# Patient Record
Sex: MALE | Race: BLACK OR AFRICAN AMERICAN | NOT HISPANIC OR LATINO | URBAN - METROPOLITAN AREA
[De-identification: names, ages, dates, MRNs, and addresses within clinical notes are randomized per-mention and may not be internally consistent; named-entity substitution may affect disease eponyms.]

---

## 2024-10-07 ENCOUNTER — OFFICE VISIT (OUTPATIENT)
Dept: URGENT CARE | Age: 24
End: 2024-10-07
Payer: MEDICAID

## 2024-10-07 VITALS
DIASTOLIC BLOOD PRESSURE: 83 MMHG | WEIGHT: 175.04 LBS | RESPIRATION RATE: 18 BRPM | TEMPERATURE: 98.7 F | SYSTOLIC BLOOD PRESSURE: 118 MMHG | OXYGEN SATURATION: 97 % | HEART RATE: 73 BPM

## 2024-10-07 DIAGNOSIS — Z11.3 SCREEN FOR STD (SEXUALLY TRANSMITTED DISEASE): ICD-10-CM

## 2024-10-07 DIAGNOSIS — Z20.2 POSSIBLE EXPOSURE TO STI: Primary | ICD-10-CM

## 2024-10-07 PROCEDURE — 87591 N.GONORRHOEAE DNA AMP PROB: CPT

## 2024-10-07 PROCEDURE — 87491 CHLMYD TRACH DNA AMP PROBE: CPT

## 2024-10-07 PROCEDURE — 87661 TRICHOMONAS VAGINALIS AMPLIF: CPT

## 2024-10-07 PROCEDURE — 99203 OFFICE O/P NEW LOW 30 MIN: CPT | Performed by: NURSE PRACTITIONER

## 2024-10-07 PROCEDURE — 87529 HSV DNA AMP PROBE: CPT

## 2024-10-07 NOTE — PATIENT INSTRUCTIONS
We will send your urine for STD testing and call you with results if positive.  It takes about 72 hrs for results to come back

## 2024-10-07 NOTE — PROGRESS NOTES
Subjective   Patient ID: Edmundo Artis is a 24 y.o. male. They present today with a chief complaint of STI Screening.    History of Present Illness  HPI  Pt is a 24 yr old male who has clear discharge from his meatus and a dry spot on the shaft of his penis.  He also has a lesion on his upper lip that all started a week ago  Past Medical History  Allergies as of 10/07/2024    (No Known Allergies)       (Not in a hospital admission)       No past medical history on file.    No past surgical history on file.         Review of Systems  Review of Systems  Gen: No fatigue, fever, sweats.  Head: No headache, trauma.  Eyes: No vision loss, double vision, drainage, eye pain.  ENT: No hearing changes, pain, epistaxis, congestion  Cardiac: No chest pain  Pulmonary: No shortness of breath,  pleuritic pain,   Heme/lymph: No swollen glands  GI: No abdominal pain, nausea, vomiting, diarrhea  : No  dysuria, frequency, urgency, hematuria +penile discharge  Musculoskeletal: No limb pain, joint pain, back pain, joint swelling or stiffness.  Skin: No rashes, pruritus, lumps, lesions.  Neuro: No Numbness, tingling, or weakness.  Psych: No  anxiety     Review of systems is otherwise negative unless stated above or in history of present illness.                             Objective    Vitals:    10/07/24 1208   BP: 118/83   Pulse: 73   Resp: 18   Temp: 37.1 °C (98.7 °F)   SpO2: 97%   Weight: 79.4 kg (175 lb 0.7 oz)     No LMP for male patient.    Physical Exam  General: Vital signs stable, Pt is alert, no acute distress  Eyes: Conjunctiva normal, PERRL, EOMs intact  HENMT: Normocephalic, atraumatic, external ears and nose normal, no scars or masses.  No mastoid tenderness. Trachea is midline. No meningeal signs, negative Kernig and Brudzinski, moves neck freely.  No sinus tenderness  Resp: Respiratory effort is normal, no retractions, no stridor. Lungs CTA, no wheezes or rhonchi  CV: Heart is regular rate and rhythm.   Skin: + lesion  on upper lip looks, dry patch on shaft of penis no lesions on exam  Skel: full range of motion of upper and lower extremities.   Neuro: Normal gait, CN II-XII intact, no motor or sensory changes.  Psych: Alert and oriented ×3, judgment is appropriate, normal mood and affect   Procedures    Point of Care Test & Imaging Results from this visit  No results found for this visit on 10/07/24.   No results found.    Diagnostic study results (if any) were reviewed by KIERSTEN Christensen.    Assessment/Plan   Allergies, medications, history, and pertinent labs/EKGs/Imaging reviewed by KIERSTEN Christensen.     Medical Decision Making  History and physical consistent with possible std.  Testing done and will call him with results.  No signs of syphilis, no chancre on penis.  If still concerned after testing he can go to his pcp and have other blood work done    Orders and Diagnoses  There are no diagnoses linked to this encounter.    Medical Admin Record      Patient disposition: Home    Electronically signed by KIERSTEN Christensen  12:12 PM

## 2024-10-08 ENCOUNTER — TELEPHONE (OUTPATIENT)
Dept: URGENT CARE | Age: 24
End: 2024-10-08

## 2024-10-08 DIAGNOSIS — A74.9 CHLAMYDIA: Primary | ICD-10-CM

## 2024-10-08 LAB
C TRACH RRNA SPEC QL NAA+PROBE: POSITIVE
HSV1 DNA SKIN QL NAA+PROBE: NOT DETECTED
HSV2 DNA SKIN QL NAA+PROBE: NOT DETECTED
N GONORRHOEA DNA SPEC QL PROBE+SIG AMP: NEGATIVE
T VAGINALIS RRNA SPEC QL NAA+PROBE: NEGATIVE

## 2024-10-08 RX ORDER — DOXYCYCLINE 100 MG/1
100 CAPSULE ORAL 2 TIMES DAILY
Qty: 20 CAPSULE | Refills: 0 | Status: SHIPPED | OUTPATIENT
Start: 2024-10-08 | End: 2024-10-18

## 2024-10-08 RX ORDER — METRONIDAZOLE 500 MG/1
500 TABLET ORAL 3 TIMES DAILY
Qty: 14 TABLET | Refills: 0 | Status: SHIPPED | OUTPATIENT
Start: 2024-10-08 | End: 2024-10-08

## 2024-10-08 RX ORDER — METRONIDAZOLE 500 MG/1
500 TABLET ORAL 2 TIMES DAILY
Qty: 14 TABLET | Refills: 0 | Status: SHIPPED | OUTPATIENT
Start: 2024-10-08 | End: 2024-10-08 | Stop reason: ENTERED-IN-ERROR

## 2024-10-08 NOTE — TELEPHONE ENCOUNTER
Spoke with patient he is aware of his results and will call in flagyl for the positive chlamydia to [facility] in [place]

## 2024-11-06 ENCOUNTER — OFFICE VISIT (OUTPATIENT)
Dept: URGENT CARE | Age: 24
End: 2024-11-06
Payer: MEDICAID

## 2024-11-06 VITALS
OXYGEN SATURATION: 97 % | TEMPERATURE: 97.8 F | WEIGHT: 171 LBS | HEART RATE: 65 BPM | HEIGHT: 67 IN | SYSTOLIC BLOOD PRESSURE: 130 MMHG | DIASTOLIC BLOOD PRESSURE: 82 MMHG | BODY MASS INDEX: 26.84 KG/M2

## 2024-11-06 DIAGNOSIS — Z11.3 ROUTINE SCREENING FOR STI (SEXUALLY TRANSMITTED INFECTION): Primary | ICD-10-CM

## 2024-11-06 PROCEDURE — 3008F BODY MASS INDEX DOCD: CPT | Performed by: NURSE PRACTITIONER

## 2024-11-06 PROCEDURE — 87661 TRICHOMONAS VAGINALIS AMPLIF: CPT

## 2024-11-06 PROCEDURE — 87491 CHLMYD TRACH DNA AMP PROBE: CPT

## 2024-11-06 PROCEDURE — 99213 OFFICE O/P EST LOW 20 MIN: CPT | Performed by: NURSE PRACTITIONER

## 2024-11-06 PROCEDURE — 87591 N.GONORRHOEAE DNA AMP PROB: CPT

## 2024-11-06 NOTE — PROGRESS NOTES
"Subjective   Patient ID: Edmundo Artis is a 24 y.o. male. They present today with a chief complaint of STI Screening.    History of Present Illness  23 yo male coming in for STI testing. He states he was treated for Chlamydia and took all of the medications and just wants to make sure he is clear. He denies any discharge or dysuria. He denies any other complaints.     Past Medical History  Allergies as of 11/06/2024    (No Known Allergies)       (Not in a hospital admission)       No past medical history on file.    No past surgical history on file.         Review of Systems  Review of Systems:  General: No weight loss, fatigue, anorexia, insomnia, fever, chills.  Cardiac: No chest pain, palpitations, syncope, near syncope.  Pulmonary:  No shortness of breath, cough, hemoptysis  Heme/lymph: No swollen glands, fever, bleeding  GI: No abdominal pain, change in bowel habits, melena, hematemesis, hematochezia, nausea, vomiting, or diarrhea  : No discharge, dysuria, frequency, urgency, hematuria  Musculoskeletal: No limb pain, joint pain, joint swelling.  Skin: No rashes  Neuro: No numbness, tingling, headaches                                 Objective    Vitals:    11/06/24 1217   BP: 130/82   BP Location: Left arm   Patient Position: Sitting   BP Cuff Size: Large adult   Pulse: 65   Temp: 36.6 °C (97.8 °F)   TempSrc: Oral   SpO2: 97%   Weight: 77.6 kg (171 lb)   Height: 1.702 m (5' 7\")     No LMP for male patient.    Physical Exam  Physical Exam:  General: Vital noted, no distress. Afebrile  Cardiac: Regular rate and rhythm, no murmur  Pulmonary: Lungs clear bilaterally with good aeration. No adventitious breath sounds.  Abdomen: Soft, nontender, nonsurgical. No peritoneal signs. Normoactive bowel sounds.   Skin: No rashes  Neuro: No focal neurologic deficits, NIH score of 0.      Procedures    Point of Care Test & Imaging Results from this visit  No results found for this visit on 11/06/24.   No results " found.    Diagnostic study results (if any) were reviewed by KIERSTEN Marie.    Assessment/Plan   Allergies, medications, history, and pertinent labs/EKGs/Imaging reviewed by KIERSTEN Marie.     Medical Decision Making  Testing: GC/ chlamydia/trich   Treatment: No treatment given at this time.   Differential: 1) sti screening, 2) chlamydia, 3) uti  Plan: Discussed differential with the pateint. Patient will follow up with the PCP in the next 2-3 days. Return for any worsening symptoms or go to the ER for further evaluation. Patient understands return precautions and discharege insturctions.  Impression:   1) STI screening      Orders and Diagnoses  Diagnoses and all orders for this visit:  Routine screening for STI (sexually transmitted infection)  -     Trichomonas vaginalis, Amplified  -     C. trachomatis / N. gonorrhoeae, Amplified      Medical Admin Record      Patient disposition: Home    Electronically signed by KIERSTEN Marie  12:26 PM

## 2024-11-07 LAB
C TRACH RRNA SPEC QL NAA+PROBE: NEGATIVE
N GONORRHOEA DNA SPEC QL PROBE+SIG AMP: NEGATIVE
T VAGINALIS RRNA SPEC QL NAA+PROBE: NEGATIVE

## 2024-11-16 ENCOUNTER — OFFICE VISIT (OUTPATIENT)
Dept: URGENT CARE | Age: 24
End: 2024-11-16
Payer: MEDICAID

## 2024-11-16 VITALS
SYSTOLIC BLOOD PRESSURE: 131 MMHG | WEIGHT: 170 LBS | BODY MASS INDEX: 26.63 KG/M2 | DIASTOLIC BLOOD PRESSURE: 77 MMHG | HEART RATE: 70 BPM

## 2024-11-16 DIAGNOSIS — Z20.2 POTENTIAL EXPOSURE TO STD: Primary | ICD-10-CM

## 2024-11-16 LAB
POC BILIRUBIN, URINE: NEGATIVE
POC BLOOD, URINE: NEGATIVE
POC GLUCOSE, URINE: NEGATIVE MG/DL
POC KETONES, URINE: NEGATIVE MG/DL
POC LEUKOCYTES, URINE: NEGATIVE
POC NITRITE,URINE: NEGATIVE
POC PH, URINE: 6 PH
POC PROTEIN, URINE: NEGATIVE MG/DL
POC SPECIFIC GRAVITY, URINE: 1.01
POC UROBILINOGEN, URINE: 0.2 EU/DL

## 2024-11-16 PROCEDURE — 87661 TRICHOMONAS VAGINALIS AMPLIF: CPT

## 2024-11-16 PROCEDURE — 87491 CHLMYD TRACH DNA AMP PROBE: CPT

## 2024-11-16 PROCEDURE — 87591 N.GONORRHOEAE DNA AMP PROB: CPT

## 2024-11-16 NOTE — PATIENT INSTRUCTIONS
You were seen at Urgent Care today for STD screening.  You will be notified of any positive results and start appropriate treatment at that time. Monitor for red flags which we spoke about, If your symptoms change, worsen or become concerning in any way, please go to the emergency room immediately, otherwise you can followup with your PCP in 2-3 days as needed

## 2024-11-16 NOTE — PROGRESS NOTES
Subjective   Patient ID: Edmundo Artis is a 24 y.o. male. They present today with a chief complaint of Exposure to STD (Patient has concerns of a possible STD . ).    History of Present Illness  Patient is a 24-year-old male with no reported past medical history presents urgent care today for possible STD exposure.  He is asymptomatic at this time but is requesting testing.  No other complaints or concerns mention at this time.        History provided by:  Patient  Exposure to STD      Past Medical History  Allergies as of 11/16/2024    (No Known Allergies)       (Not in a hospital admission)         No past medical history on file.    No past surgical history on file.         Review of Systems  Review of Systems   All other systems reviewed and are negative.                                 Objective    Vitals:    11/16/24 1341   BP: 131/77   BP Location: Right arm   Patient Position: Sitting   Pulse: 70   Weight: 77.1 kg (170 lb)     No LMP for male patient.    Physical Exam  Vitals and nursing note reviewed.   Constitutional:       General: He is not in acute distress.     Appearance: Normal appearance. He is not ill-appearing, toxic-appearing or diaphoretic.   HENT:      Head: Normocephalic and atraumatic.      Mouth/Throat:      Mouth: Mucous membranes are moist.   Eyes:      Extraocular Movements: Extraocular movements intact.      Conjunctiva/sclera: Conjunctivae normal.      Pupils: Pupils are equal, round, and reactive to light.   Cardiovascular:      Rate and Rhythm: Normal rate and regular rhythm.      Pulses: Normal pulses.      Heart sounds: Normal heart sounds.   Pulmonary:      Effort: Pulmonary effort is normal. No respiratory distress.      Breath sounds: Normal breath sounds. No stridor. No wheezing, rhonchi or rales.   Chest:      Chest wall: No tenderness.   Musculoskeletal:         General: Normal range of motion.      Cervical back: Normal range of motion and neck supple.   Skin:     General:  Skin is warm and dry.      Capillary Refill: Capillary refill takes less than 2 seconds.   Neurological:      General: No focal deficit present.      Mental Status: He is alert and oriented to person, place, and time.   Psychiatric:         Mood and Affect: Mood normal.         Behavior: Behavior normal.         Procedures      Assessment/Plan   Allergies, medications, history, and pertinent labs/EKGs/Imaging reviewed by KIERSTEN Bush.     Medical Decision Making  Patient is well appearing, afebrile, non toxic, not hypoxic, and appropriate for outpatient treatment and management at time of evaluation. Patient presents with request for STD screening.  He is currently asymptomatic.  Differential includes but not limited to: Chlamydia, gonorrhea, trichomoniasis, other.  Urinalysis is negative for infection or hematuria.  Specimen sent to lab for testing.  Patient is aware he will be notified of positive results and start appropriate treatment at that time.  He was discharged stable condition.  All questions and concerns addressed.      Plan: Discussed differential with the patient. Patient voices understanding and is agreeable to close follow-up with their PCP in the next 2-3 days. They understand they should go to the emergency room immediately for any new, worsening or concerning symptoms. Patient understands return precautions and discharge instructions.      Orders and Diagnoses  Diagnoses and all orders for this visit:  Potential exposure to STD  -     POCT UA Automated manually resulted  -     C. trachomatis / N. gonorrhoeae, Amplified  -     Trichomonas vaginalis, Amplified      Medical Admin Record      Follow Up Instructions  No follow-ups on file.    Patient disposition: Home    Electronically signed by KIERSTEN Bush  1:59 PM

## 2025-02-05 ENCOUNTER — OFFICE VISIT (OUTPATIENT)
Dept: URGENT CARE | Age: 25
End: 2025-02-05
Payer: MEDICAID

## 2025-02-05 VITALS
SYSTOLIC BLOOD PRESSURE: 148 MMHG | HEART RATE: 74 BPM | OXYGEN SATURATION: 95 % | TEMPERATURE: 97.8 F | RESPIRATION RATE: 16 BRPM | DIASTOLIC BLOOD PRESSURE: 82 MMHG

## 2025-02-05 DIAGNOSIS — Z72.51 HIGH RISK HETEROSEXUAL BEHAVIOR: ICD-10-CM

## 2025-02-05 DIAGNOSIS — Z11.3 SCREEN FOR STD (SEXUALLY TRANSMITTED DISEASE): Primary | ICD-10-CM

## 2025-02-05 ASSESSMENT — ENCOUNTER SYMPTOMS
MYALGIAS: 0
DIARRHEA: 0
WHEEZING: 0
DYSURIA: 0
HEADACHES: 0
HEMATURIA: 0
COUGH: 0
DIFFICULTY URINATING: 0
FLANK PAIN: 0
ABDOMINAL DISTENTION: 0
ACTIVITY CHANGE: 0
BACK PAIN: 0
FREQUENCY: 0
RECTAL PAIN: 0
FATIGUE: 0
VOMITING: 0
WOUND: 0
ABDOMINAL PAIN: 0
NAUSEA: 0
SHORTNESS OF BREATH: 0
CHILLS: 0
FEVER: 0
APPETITE CHANGE: 0
COLOR CHANGE: 0
ANAL BLEEDING: 0
EYES NEGATIVE: 1

## 2025-02-05 NOTE — PATIENT INSTRUCTIONS
We will call you with your results.    Please follow up with your PCP for additional testing as discussed

## 2025-02-05 NOTE — PROGRESS NOTES
"Subjective   Patient ID: Edmundo Artis is a 24 y.o. male. They present today with a chief complaint of Exposure to STD (std).    History of Present Illness    Exposure to STD  Associated symptoms: no abdominal pain, no chest pain, no cough, no diarrhea, no fatigue, no fever, no headaches, no myalgias, no nausea, no rash, no shortness of breath, no vomiting and no wheezing     this is a 24-year-old male who presents to the clinic today with a goal for STD screening.  Patient states he was on a cruise at the beginning of the month\" had some fun \".  Patient currently is asymptomatic but wants to be \"safe\".  Patient states he had unprotected sex with multiple partners.  Partners were female.  Patient currently is asymptomatic.  Patient does have primary care he can follow-up for additional screening.  Denies hematuria, flank pain, nausea, vomiting, abdominal pain,  complaints.  Denies fever, chills.    Past Medical History  Allergies as of 02/05/2025    (No Known Allergies)       (Not in a hospital admission)       History reviewed. No pertinent past medical history.    History reviewed. No pertinent surgical history.         Review of Systems  Review of Systems   Constitutional:  Negative for activity change, appetite change, chills, fatigue and fever.   HENT: Negative.     Eyes: Negative.    Respiratory:  Negative for cough, shortness of breath and wheezing.    Cardiovascular:  Negative for chest pain.   Gastrointestinal:  Negative for abdominal distention, abdominal pain, anal bleeding, diarrhea, nausea, rectal pain and vomiting.   Genitourinary:  Negative for decreased urine volume, difficulty urinating, dysuria, flank pain, frequency, genital sores, hematuria, penile discharge, penile pain, penile swelling, scrotal swelling, testicular pain and urgency.   Musculoskeletal:  Negative for back pain and myalgias.   Skin:  Negative for color change, pallor, rash and wound.   Neurological:  Negative for headaches. "                                  Objective    Vitals:    02/05/25 1116   BP: 148/80   Pulse: 74   Resp: 16   Temp: 36.6 °C (97.8 °F)   TempSrc: Oral   SpO2: 95%     No LMP for male patient.    Physical Exam  Vitals and nursing note reviewed.   Constitutional:       General: He is not in acute distress.     Appearance: He is not ill-appearing, toxic-appearing or diaphoretic.   HENT:      Head: Normocephalic and atraumatic.   Cardiovascular:      Rate and Rhythm: Normal rate and regular rhythm.      Heart sounds: Normal heart sounds.   Pulmonary:      Effort: Pulmonary effort is normal.      Breath sounds: Normal breath sounds.   Abdominal:      General: Abdomen is flat. Bowel sounds are normal. There is no distension.      Palpations: Abdomen is soft.      Tenderness: There is no abdominal tenderness. There is no right CVA tenderness or left CVA tenderness.   Musculoskeletal:         General: Normal range of motion.      Cervical back: Neck supple.   Skin:     General: Skin is warm and dry.   Neurological:      General: No focal deficit present.      Mental Status: He is oriented to person, place, and time.         Procedures    Point of Care Test & Imaging Results from this visit  No results found for this visit on 02/05/25.   No results found.    Diagnostic study results (if any) were reviewed by Kristin L Schoenlein, APRN-CNP.    Assessment/Plan   Allergies, medications, history, and pertinent labs/EKGs/Imaging reviewed by Kristin L Schoenlein, APRN-CNP.     Medical Decision Making  VSS, NAD, Nontoxic appearing.  Physical exam as charted above.  Patient deferred  exam in clinic.  Urine sample will be sent to the lab to rule out trichomonas, gonorrhea, chlamydia.  Added mycoplasma, and Ureaplasma due to high risk sexual activity.  Will treat pending results.  Safe sex practices reviewed at length.  Encouraged PCP follow-up for blood work ASAP and then in 90 days.    Differential Dx include, however, are not  limited to: STI, UTI, acute abdomen, epididymitis, prostatitis    I have a low suspicion for any acute pathologies requiring emergent evaluation and further workup at this time.  I believe patient is safe to discharge home with a low threshold for emergency room as discussed during visit.  We discussed close follow-up with primary care provider/pediatrician. Supportive care discussed.  Medication(s) profile of OTC and Rxed medication(s) if prescribed was (were) reviewed.  Close follow-up reviewed.  ED for red flags.  All questions answered and addressed.  Patient verbalized understanding.      Orders and Diagnoses  Diagnoses and all orders for this visit:  Screen for STD (sexually transmitted disease)  -     C. trachomatis / N. gonorrhoeae, Amplified, Urogenital  -     Trichomonas vaginalis, Amplified  -     Mycoplasma / Ureaplasma Culture; Future  High risk heterosexual behavior  -     C. trachomatis / N. gonorrhoeae, Amplified, Urogenital  -     Trichomonas vaginalis, Amplified  -     Mycoplasma / Ureaplasma Culture; Future      Medical Admin Record      Patient disposition: Home    Electronically signed by Kristin L Schoenlein, APRN-CNP  11:40 AM

## 2025-02-06 LAB
C TRACH RRNA SPEC QL NAA+PROBE: NOT DETECTED
N GONORRHOEA RRNA SPEC QL NAA+PROBE: NOT DETECTED
QUEST GC CT AMPLIFIED (ALWAYS MESSAGE): NORMAL
T VAGINALIS RRNA SPEC QL NAA+PROBE: NOT DETECTED

## 2025-08-19 ENCOUNTER — APPOINTMENT (OUTPATIENT)
Dept: PRIMARY CARE | Facility: CLINIC | Age: 25
End: 2025-08-19
Payer: MEDICAID

## 2025-08-20 PROBLEM — M54.6 PAIN IN THORACIC SPINE: Status: ACTIVE | Noted: 2025-08-20

## 2025-08-20 PROBLEM — M23.300 LATERAL MENISCUS DERANGEMENT, RIGHT: Status: ACTIVE | Noted: 2025-08-20

## 2025-08-20 PROBLEM — D57.3 SICKLE CELL TRAIT: Status: ACTIVE | Noted: 2025-08-20

## 2025-08-29 ENCOUNTER — APPOINTMENT (OUTPATIENT)
Dept: PRIMARY CARE | Facility: CLINIC | Age: 25
End: 2025-08-29
Payer: MEDICAID

## 2025-09-12 ENCOUNTER — APPOINTMENT (OUTPATIENT)
Dept: CARDIOLOGY | Facility: CLINIC | Age: 25
End: 2025-09-12
Payer: MEDICAID